# Patient Record
Sex: OTHER/UNKNOWN | URBAN - METROPOLITAN AREA
[De-identification: names, ages, dates, MRNs, and addresses within clinical notes are randomized per-mention and may not be internally consistent; named-entity substitution may affect disease eponyms.]

---

## 2018-06-22 ENCOUNTER — APPOINTMENT (RX ONLY)
Dept: URBAN - METROPOLITAN AREA CLINIC 92 | Facility: CLINIC | Age: 53
Setting detail: DERMATOLOGY
End: 2018-06-22

## 2018-06-22 DIAGNOSIS — L57.8 OTHER SKIN CHANGES DUE TO CHRONIC EXPOSURE TO NONIONIZING RADIATION: ICD-10-CM

## 2018-06-22 DIAGNOSIS — D22 MELANOCYTIC NEVI: ICD-10-CM

## 2018-06-22 DIAGNOSIS — L81.4 OTHER MELANIN HYPERPIGMENTATION: ICD-10-CM

## 2018-06-22 DIAGNOSIS — Z71.89 OTHER SPECIFIED COUNSELING: ICD-10-CM

## 2018-06-22 PROBLEM — D22.5 MELANOCYTIC NEVI OF TRUNK: Status: ACTIVE | Noted: 2018-06-22

## 2018-06-22 PROBLEM — D22.22 MELANOCYTIC NEVI OF LEFT EAR AND EXTERNAL AURICULAR CANAL: Status: ACTIVE | Noted: 2018-06-22

## 2018-06-22 PROCEDURE — ? PRESCRIPTION

## 2018-06-22 PROCEDURE — ? COUNSELING

## 2018-06-22 PROCEDURE — ? TREATMENT REGIMEN

## 2018-06-22 PROCEDURE — 99203 OFFICE O/P NEW LOW 30 MIN: CPT

## 2018-06-22 PROCEDURE — ? OBSERVATION AND MEASURE

## 2018-06-22 RX ORDER — TRETINOIN 0.25 MG/G
CREAM TOPICAL
Qty: 1 | Refills: 4 | Status: ERX | COMMUNITY
Start: 2018-06-22

## 2018-06-22 RX ADMIN — TRETINOIN: 0.25 CREAM TOPICAL at 00:00

## 2018-06-22 ASSESSMENT — LOCATION ZONE DERM
LOCATION ZONE: TRUNK
LOCATION ZONE: FACE
LOCATION ZONE: TRUNK
LOCATION ZONE: EAR

## 2018-06-22 ASSESSMENT — LOCATION DETAILED DESCRIPTION DERM
LOCATION DETAILED: RIGHT SUPERIOR UPPER BACK
LOCATION DETAILED: LEFT CENTRAL MALAR CHEEK
LOCATION DETAILED: SUPERIOR THORACIC SPINE
LOCATION DETAILED: LEFT SUPERIOR HELIX
LOCATION DETAILED: RIGHT SUPERIOR UPPER BACK

## 2018-06-22 ASSESSMENT — LOCATION SIMPLE DESCRIPTION DERM
LOCATION SIMPLE: UPPER BACK
LOCATION SIMPLE: LEFT EAR
LOCATION SIMPLE: RIGHT UPPER BACK
LOCATION SIMPLE: RIGHT UPPER BACK
LOCATION SIMPLE: LEFT CHEEK

## 2018-06-22 NOTE — PROCEDURE: TREATMENT REGIMEN
Initiate Treatment: Sent to [facility] tretinoin .025% cream Apply a pea size amount to face at night. Start 3x/wk x 2 wks, then 4x/wk x 2 wk then slowly increase to every night.
Plan: -will increase tretinoin at next OV\\n-ok to moisturize after applying tretinoin cream\\n-d/w pt briefly the difference between Accutane and tretinoin\\n\\n[facility] \\nPhone= [phone]
Otc Regimen: CeraVe moisturizing cream
Detail Level: Zone

## 2018-06-22 NOTE — HPI: FULL BODY SKIN EXAMINATION
How Severe Are Your Spot(S)?: moderate
What Is The Reason For Today's Visit?: Full Body Skin Examination
What Is The Reason For Today's Visit? (Being Monitored For X): concerning skin lesions on an annual basis
Additional History: Patient has never had a fbse before. Patient has never had biopsies done before. Patient has no immediate concerns.

## 2018-10-17 ENCOUNTER — APPOINTMENT (RX ONLY)
Dept: URBAN - METROPOLITAN AREA CLINIC 92 | Facility: CLINIC | Age: 53
Setting detail: DERMATOLOGY
End: 2018-10-17

## 2018-10-17 DIAGNOSIS — Z71.89 OTHER SPECIFIED COUNSELING: ICD-10-CM

## 2018-10-17 DIAGNOSIS — L81.1 CHLOASMA: ICD-10-CM

## 2018-10-17 PROCEDURE — ? TREATMENT REGIMEN

## 2018-10-17 PROCEDURE — ? COUNSELING

## 2018-10-17 PROCEDURE — ? OTHER

## 2018-10-17 PROCEDURE — ? RECOMMENDATIONS

## 2018-10-17 PROCEDURE — 99213 OFFICE O/P EST LOW 20 MIN: CPT

## 2018-10-17 ASSESSMENT — LOCATION DETAILED DESCRIPTION DERM
LOCATION DETAILED: LEFT INFERIOR CENTRAL MALAR CHEEK
LOCATION DETAILED: RIGHT CENTRAL MALAR CHEEK
LOCATION DETAILED: LEFT SUPERIOR LATERAL BUCCAL CHEEK

## 2018-10-17 ASSESSMENT — LOCATION ZONE DERM: LOCATION ZONE: FACE

## 2018-10-17 ASSESSMENT — LOCATION SIMPLE DESCRIPTION DERM
LOCATION SIMPLE: LEFT CHEEK
LOCATION SIMPLE: RIGHT CHEEK

## 2018-10-17 NOTE — PROCEDURE: OTHER
Detail Level: Zone
Note Text (......Xxx Chief Complaint.): This diagnosis correlates with the
Other (Free Text): -Patient is taking testosterone. Informed patient usually estrogen that causes melasma.\\n-Informed patient stopping testosterone will help with discoloration but would not recommend \\n-Informed patient to keep using sunscreen spf30/+ every day, re-applying every 2 hours \\n-patient plays tennis.

## 2018-10-17 NOTE — HPI: PHOTOAGING (ACTINIC DAMAGE)
How Severe Is It?: moderate
Is This A New Presentation, Or A Follow-Up?: Photoaging
Additional History: Patient reports using tretinoin .025% cream every night. Patient uses sunscreen daily. Patient plays tennis. Patient reports sometimes her skin will get irritated and she will skip a night. Patient has a new discoloration spot on her face.

## 2018-10-17 NOTE — PROCEDURE: TREATMENT REGIMEN
Detail Level: Zone
Plan: Recommended to use Cetaphil cream, CeraVe cream to apply after tretinoin \\n-pt will wash face, apply bleaching cream, apply sunscreen \\n-at night, wash face, apply tretinoin, then apply moisturizer\\nCalled [facility] spoke to AMANDA, sent in kojic acid6%-HQ 12%. 0 RF
Initiate Treatment: [facility] Kojic acid 6%-hydroquinone 12% Apply a thin amount to affected areas every morning
Continue Regimen: Tretinoin .025% cream Apply a pea sized amount to entire face and neck every night

## 2018-10-17 NOTE — PROCEDURE: RECOMMENDATIONS
Recommendation Preamble: The following recommendations were made during the visit:
Recommendations (Free Text): [employer] spf30+ powder
Detail Level: Zone

## 2019-02-08 ENCOUNTER — APPOINTMENT (RX ONLY)
Dept: URBAN - METROPOLITAN AREA CLINIC 92 | Facility: CLINIC | Age: 54
Setting detail: DERMATOLOGY
End: 2019-02-08

## 2019-02-08 DIAGNOSIS — L81.1 CHLOASMA: ICD-10-CM | Status: IMPROVED

## 2019-02-08 PROCEDURE — ? COUNSELING

## 2019-02-08 PROCEDURE — ? OTHER

## 2019-02-08 PROCEDURE — 99213 OFFICE O/P EST LOW 20 MIN: CPT

## 2019-02-08 PROCEDURE — ? TREATMENT REGIMEN

## 2019-02-08 ASSESSMENT — LOCATION SIMPLE DESCRIPTION DERM: LOCATION SIMPLE: LEFT CHEEK

## 2019-02-08 ASSESSMENT — LOCATION DETAILED DESCRIPTION DERM: LOCATION DETAILED: LEFT SUPERIOR LATERAL BUCCAL CHEEK

## 2019-02-08 ASSESSMENT — LOCATION ZONE DERM: LOCATION ZONE: FACE

## 2019-02-08 NOTE — HPI: DISCOLORATION (MELASMA)
How Severe Are They?: moderate
Is This A New Presentation, Or A Follow-Up?: Follow Up Melasma
Additional History: Patient states she has seen improvement but she can still see some discoloration. Patient uses the bleaching cream every morning and the Tretinoin cream .025% at night.

## 2019-02-08 NOTE — PROCEDURE: TREATMENT REGIMEN
Otc Regimen: Tinted Sunscreen spf30+
Continue Regimen: Tretinoin .025% cream Apply a pea sized amount to entire face and neck every night
Detail Level: Zone
Plan: -pt will finish the bleaching cream bottle and continue Tretinoin .025% cream\\n-will re-start KJ-HQ cream in the Fall. \\n-pt will call our office in April or August or September for a refill of bleaching cream.
Discontinue Regimen: [facility] Kojic acid 6%-hydroquinone 12% Apply a thin amount to affected areas every morning

## 2019-02-08 NOTE — PROCEDURE: OTHER
Other (Free Text): -Patient is taking testosterone, added estrogen, and progesterone since last visit. \\n-pt states she feels better but now has acne.\\n-Informed patient to keep using sunscreen spf30/+ every day, re-applying every 2 hours \\n-recommended Iron [employer]ide Elta MD tinted, because heat will make melasma worse.\\n-patient plays tennis, and states she gets irritated from the bleaching cream\\n-pt will get FBSE next OV
Detail Level: Zone
Note Text (......Xxx Chief Complaint.): This diagnosis correlates with the

## 2020-01-02 ENCOUNTER — APPOINTMENT (RX ONLY)
Dept: URBAN - METROPOLITAN AREA CLINIC 73 | Facility: CLINIC | Age: 55
Setting detail: DERMATOLOGY
End: 2020-01-02

## 2020-01-02 DIAGNOSIS — L81.1 CHLOASMA: ICD-10-CM | Status: IMPROVED

## 2020-01-02 DIAGNOSIS — L71.0 PERIORAL DERMATITIS: ICD-10-CM

## 2020-01-02 DIAGNOSIS — L81.4 OTHER MELANIN HYPERPIGMENTATION: ICD-10-CM

## 2020-01-02 DIAGNOSIS — Z71.89 OTHER SPECIFIED COUNSELING: ICD-10-CM

## 2020-01-02 DIAGNOSIS — D22 MELANOCYTIC NEVI: ICD-10-CM

## 2020-01-02 PROBLEM — D22.5 MELANOCYTIC NEVI OF TRUNK: Status: ACTIVE | Noted: 2020-01-02

## 2020-01-02 PROCEDURE — ? TREATMENT REGIMEN

## 2020-01-02 PROCEDURE — ? PRESCRIPTION

## 2020-01-02 PROCEDURE — ? COUNSELING

## 2020-01-02 PROCEDURE — ? OTHER

## 2020-01-02 PROCEDURE — ? OBSERVATION AND MEASURE

## 2020-01-02 PROCEDURE — 99214 OFFICE O/P EST MOD 30 MIN: CPT

## 2020-01-02 RX ORDER — TACROLIMUS 1 MG/G
OINTMENT TOPICAL BID
Qty: 1 | Refills: 2 | Status: ERX | COMMUNITY
Start: 2020-01-02

## 2020-01-02 RX ADMIN — TACROLIMUS: 1 OINTMENT TOPICAL at 00:00

## 2020-01-02 ASSESSMENT — LOCATION SIMPLE DESCRIPTION DERM
LOCATION SIMPLE: RIGHT UPPER BACK
LOCATION SIMPLE: UPPER BACK
LOCATION SIMPLE: RIGHT CHEEK
LOCATION SIMPLE: RIGHT UPPER BACK
LOCATION SIMPLE: LEFT CHEEK
LOCATION SIMPLE: LEFT LIP

## 2020-01-02 ASSESSMENT — LOCATION ZONE DERM
LOCATION ZONE: LIP
LOCATION ZONE: FACE
LOCATION ZONE: TRUNK
LOCATION ZONE: TRUNK

## 2020-01-02 ASSESSMENT — LOCATION DETAILED DESCRIPTION DERM
LOCATION DETAILED: RIGHT INFERIOR CENTRAL MALAR CHEEK
LOCATION DETAILED: LEFT UPPER CUTANEOUS LIP
LOCATION DETAILED: RIGHT SUPERIOR UPPER BACK
LOCATION DETAILED: RIGHT LATERAL UPPER BACK
LOCATION DETAILED: RIGHT SUPERIOR UPPER BACK
LOCATION DETAILED: SUPERIOR THORACIC SPINE
LOCATION DETAILED: LEFT SUPERIOR LATERAL BUCCAL CHEEK

## 2020-01-02 NOTE — PROCEDURE: OTHER
Other (Free Text): - pt presents with much improvement of melasma\\n- pt presents with redness around mouth. disc Tretinoin 0.025% could possibly be irritating around mouth / nose but most likely perioral dermatitis\\n-pt elected to restart lightening cream. Will continue [facility] Kojic acid 6%-hydroquinone 12% ( 30 grams) rx sent to [facility]) - advised to apply thin amount to affected areas every morning twice a day for 6 weeks, then once a day for 6 weeks, then 2-3 times a week until finished.\\n- will start Tretinoin .025%, hyaluronic acid combo cream (rx sent to [facility])\\n- Informed patient to keep using sunscreen spf30/+ every day, re-applying every 2 hours
Note Text (......Xxx Chief Complaint.): This diagnosis correlates with the
Detail Level: Zone
Other (Free Text): - pt with perioral dermatitis located on face\\n- disc tx options: oral abx vs topical. Pt elected to start protopic x 3 - 4 weeks.\\n- advised to contact office if rx is too expensive.

## 2020-01-02 NOTE — PROCEDURE: TREATMENT REGIMEN
Detail Level: Zone
Initiate Treatment: Tretinoin .025%, hyaluronic acid combination cream (60 grams) Apply a pea sized amount to entire face and neck every night
Continue Regimen: [facility] Kojic acid 6%-hydroquinone 12% ( 30 grams) apply thin amount to affected areas every morning twice a day for 6 weeks, then once a day for 6 weeks, then 2-3 times a week until finished. (Both Rx sent to [facility])
Initiate Treatment: Protopic 0.1 % topical ointment - Apply to affected areas on face bid x 3-4 weeks. Use as needed for flares.

## 2020-01-02 NOTE — HPI: SKIN LESION
Is This A New Presentation, Or A Follow-Up?: Skin Lesions
How Severe Is Your Skin Lesion?: moderate
Has Your Skin Lesion Been Treated?: not been treated
Additional History: Pt is concerned with skin lesions throughout body. Pt is currently using Tretinoin 0.025% every day on face for melasma. Pt reports she does have irritation and redness around mouth which has been intermittent since summer. Pt denies other concerns right now.